# Patient Record
Sex: MALE | Race: WHITE | NOT HISPANIC OR LATINO | Employment: FULL TIME | URBAN - METROPOLITAN AREA
[De-identification: names, ages, dates, MRNs, and addresses within clinical notes are randomized per-mention and may not be internally consistent; named-entity substitution may affect disease eponyms.]

---

## 2018-04-27 ENCOUNTER — OFFICE VISIT (OUTPATIENT)
Dept: URGENT CARE | Age: 43
End: 2018-04-27
Payer: COMMERCIAL

## 2018-04-27 VITALS
WEIGHT: 188 LBS | SYSTOLIC BLOOD PRESSURE: 127 MMHG | HEIGHT: 70 IN | DIASTOLIC BLOOD PRESSURE: 97 MMHG | OXYGEN SATURATION: 97 % | RESPIRATION RATE: 20 BRPM | HEART RATE: 66 BPM | BODY MASS INDEX: 26.92 KG/M2 | TEMPERATURE: 98 F

## 2018-04-27 DIAGNOSIS — S82.891A CLOSED AVULSION FRACTURE OF RIGHT ANKLE, INITIAL ENCOUNTER: Primary | ICD-10-CM

## 2018-04-27 PROCEDURE — G0382 LEV 3 HOSP TYPE B ED VISIT: HCPCS | Performed by: FAMILY MEDICINE

## 2018-04-27 PROCEDURE — 29515 APPLICATION SHORT LEG SPLINT: CPT | Performed by: FAMILY MEDICINE

## 2018-04-27 PROCEDURE — 99283 EMERGENCY DEPT VISIT LOW MDM: CPT | Performed by: FAMILY MEDICINE

## 2018-04-27 RX ORDER — FEXOFENADINE HCL 180 MG/1
180 TABLET ORAL
COMMUNITY
Start: 2017-11-03

## 2018-04-27 RX ORDER — IBUPROFEN 800 MG/1
800 TABLET ORAL EVERY 8 HOURS PRN
Qty: 30 TABLET | Refills: 0 | Status: SHIPPED | OUTPATIENT
Start: 2018-04-27

## 2018-04-27 RX ORDER — IBUPROFEN 800 MG/1
800 TABLET ORAL EVERY 8 HOURS PRN
Qty: 30 TABLET | Refills: 0 | Status: SHIPPED | OUTPATIENT
Start: 2018-04-27 | End: 2018-04-27 | Stop reason: SDUPTHER

## 2018-04-27 RX ORDER — ALBUTEROL SULFATE 0.63 MG/3ML
0.63 SOLUTION RESPIRATORY (INHALATION)
COMMUNITY
Start: 2017-11-03

## 2018-04-27 RX ORDER — ALBUTEROL SULFATE 90 UG/1
AEROSOL, METERED RESPIRATORY (INHALATION)
COMMUNITY
Start: 2018-03-06

## 2018-04-27 NOTE — PROGRESS NOTES
[email or web address] [facility]        NAME: Jeanne Calderón is a 43 y o  male  : 1975    MRN: [id number]  DATE: 2018  TIME: 5:08 PM    Assessment and Plan   Closed avulsion fracture of left ankle, initial encounter [H63 [id number]]  1  Closed avulsion fracture of left ankle, initial encounter  XR foot 3+ vw right    ibuprofen (MOTRIN) 800 mg tablet    Splint application    CANCELED: XR ankle 2 vw right         Patient Instructions       Follow up with PCP in 3-5 days  Proceed to  ER if symptoms worsen  Chief Complaint     Chief Complaint   Patient presents with    Ankle Injury     felldown and hurt right ankle and numbness on all toes it happen 2 weeks ago  History of Present Illness       2 weeks ago Patient was playing soccer barefoot with neices and while he was running he hyperextended R ankle  Did not twist it varus or valgus  States that initially he had profound swelling and bruising  He has been using an ace wrap and ibuprofen as well as icing injury  This has helped symptoms but they have not completely resolved  He works on his feet all day and he states that his pain has been worsening while at work so he wanted to be seen  Patient capable of walking and weight bearing  Pain currently moderate dull throbbing pain worse with weight bearing  No numbness or tingling but states that if he presses on medial aspect of ankle he can cause tingling in his toes  No other symptoms  Review of Systems   Review of Systems   Constitutional: Negative  HENT: Negative  Eyes: Negative  Respiratory: Negative  Cardiovascular: Negative  Gastrointestinal: Negative  Endocrine: Negative  Genitourinary: Negative  Musculoskeletal: Positive for gait problem and joint swelling  Skin: Negative  Negative for color change, rash and wound  Neurological: Negative for dizziness, weakness, light-headedness, numbness and headaches  Hematological: Negative  Psychiatric/Behavioral: Negative  Current Medications       Current Outpatient Prescriptions:     albuterol (ACCUNEB) 0 63 MG/3ML nebulizer solution, Inhale 0 63 mg, Disp: , Rfl:     albuterol (PROVENTIL HFA,VENTOLIN HFA) 90 mcg/act inhaler, Inhale 1 to 2 puffs by mouth every 6 hours as needed, Disp: , Rfl:     fexofenadine (ALLEGRA) 180 MG tablet, Take 180 mg by mouth, Disp: , Rfl:     fluticasone-salmeterol (ADVAIR DISKUS) 250-50 mcg/dose inhaler, Inhale 1 puff, Disp: , Rfl:     albuterol (5 mg/mL) 0 5 % nebulizer solution, Inhale, Disp: , Rfl:     ibuprofen (MOTRIN) 800 mg tablet, Take 1 tablet (800 mg total) by mouth every 8 (eight) hours as needed for mild pain, Disp: 30 tablet, Rfl: 0    Current Allergies     Allergies as of 04/27/2018    (No Known Allergies)            The following portions of the patient's history were reviewed and updated as appropriate: allergies, current medications, past family history, past medical history, past social history, past surgical history and problem list      Past Medical History:   Diagnosis Date    Asthma        History reviewed  No pertinent surgical history  History reviewed  No pertinent family history  Medications have been verified  Objective   /97   Pulse 66   Temp 98 °F (36 7 °C) (Temporal)   Resp 20   Ht 5' 10" (1 778 m)   Wt 85 3 kg (188 lb)   SpO2 97%   BMI 26 98 kg/m²        Physical Exam     Physical Exam   Constitutional: He is oriented to person, place, and time  He appears well-developed and well-nourished  No distress  HENT:   Head: Normocephalic and atraumatic  Eyes: Conjunctivae are normal  Right eye exhibits no discharge  Left eye exhibits no discharge  Cardiovascular: Normal rate, regular rhythm, normal heart sounds and intact distal pulses  Pulmonary/Chest: Effort normal and breath sounds normal  No respiratory distress  He has no wheezes  He has no rales     Musculoskeletal:        Right ankle: He exhibits normal range of motion, no ecchymosis, no deformity and normal pulse  Achilles tendon exhibits no pain and no defect  Feet:    Strength sensation circulation intact and symmetrical to LE b/l   Neurological: He is alert and oriented to person, place, and time  Skin: Skin is warm  No rash noted  He is not diaphoretic  Nursing note and vitals reviewed  XR provider read  Avulsion fracture of medial R ankle seen  Patient placed in splint and will be sent to ortho  Given instructions on RICE and splint care  Pt understands and agrees

## 2018-04-27 NOTE — LETTER
April 27, 2018     Patient: Radha Menon   YOB: 1975   Date of Visit: 4/27/2018       To Whom It May Concern: It is my medical opinion that Radha Menon should remain out of work until seen by orthopedic doctor  If you have any questions or concerns, please don't hesitate to call           Sincerely,        Margarette Mccain PA-C    CC: No Recipients

## 2018-04-27 NOTE — PATIENT INSTRUCTIONS
Rest ankle, Ice, elevate  Take ibuprofen for pain as needed  Keep splint on at all times  Follow up with orthopedics as soon as possible  Call  Pee Allison to schedule appointment: [phone]    Ankle Fracture   WHAT YOU NEED TO KNOW:   An ankle fracture is a break in 1 or more of the bones in your ankle  DISCHARGE INSTRUCTIONS:   Call 911 for any of the following:   · You feel lightheaded, short of breath, and have chest pain  · You cough up blood  Return to the emergency department if:   · Your leg feels warm, tender, and painful  It may look swollen and red  · Blood soaks through your bandage  · You have severe pain in your ankle  · Your cast feels too tight  · Your foot or toes are cold or numb  · Your foot or toenails turn blue or gray  Contact your healthcare provider if:   · Your splint feels too tight  · Your swelling has increased or returned  · You have a fever  · Your pain does not go away, even after treatment  · You have questions or concerns about your condition or care  Medicines: You may need any of the following:  · Acetaminophen  decreases pain and fever  It is available without a doctor's order  Ask how much to take and how often to take it  Follow directions  Acetaminophen can cause liver damage if not taken correctly  · NSAIDs , such as ibuprofen, help decrease swelling, pain, and fever  This medicine is available with or without a doctor's order  NSAIDs can cause stomach bleeding or kidney problems in certain people  If you take blood thinner medicine, always ask your healthcare provider if NSAIDs are safe for you  Always read the medicine label and follow directions  · Prescription pain medicine  may be given  Ask your healthcare provider how to take this medicine safely  · Take your medicine as directed  Contact your healthcare provider if you think your medicine is not helping or if you have side effects   Tell him or her if you are allergic to any medicine  Keep a list of the medicines, vitamins, and herbs you take  Include the amounts, and when and why you take them  Bring the list or the pill bottles to follow-up visits  Carry your medicine list with you in case of an emergency  Follow up with your healthcare provider in 1 to 2 days: Your fracture may need to be reduced (bones pushed back into place) or you may need surgery  Write down your questions so you remember to ask them during your visits  Support devices: You will be given a brace, cast, or splint to limit your movement and protect your ankle  You may need to use crutches to protect your ankle and decrease your pain as you move around  Do not remove your device and do not put weight on your injured ankle  Splint and cast care:  Cover the splint or cast before you bathe so it does not get wet  Tape 2 plastic trash bags to your skin above the cast  Try to keep your ankle out of the water as much as possible  Rest:  Rest your ankle so that it can heal  Return to normal activities as directed  Ice:  Apply ice on your ankle for 15 to 20 minutes every hour or as directed  Use an ice pack, or put crushed ice in a plastic bag  Cover it with a towel  Ice helps prevent tissue damage and decreases swelling and pain  Elevate:  Elevate your ankle above the level of your heart as often as you can  This will help decrease swelling and pain  Prop your ankle on pillows or blankets to keep it elevated comfortably  © 2017 [employer] Information is for End User's use only and may not be sold, redistributed or otherwise used for commercial purposes  All illustrations and images included in CareNotes® are the copyrighted property of [employer]  or Ariel Ortiz  The above information is an  only  It is not intended as medical advice for individual conditions or treatments   Talk to your doctor, nurse or pharmacist before following any medical regimen to see if it is safe and effective for you

## 2018-04-29 NOTE — PROGRESS NOTES
[facility]        NAME: Alton Marcum is a 43 y o  male  : 1975    MRN: [id number]  DATE: 2018  TIME: 9:14 AM    Assessment and Plan   Closed avulsion fracture of right ankle, initial encounter [Q32 481S]  1  Closed avulsion fracture of right ankle, initial encounter  XR foot 3+ vw right    Splint application    ibuprofen (MOTRIN) 800 mg tablet    DISCONTINUED: ibuprofen (MOTRIN) 800 mg tablet    CANCELED: XR ankle 2 vw right     Corrected laterality from previous note    Patients injury is on R ankle     ________________________________  HPI  Review of Systems  Physical Exam